# Patient Record
(demographics unavailable — no encounter records)

---

## 2024-10-10 NOTE — DISCUSSION/SUMMARY
[AICD Function Normal] : normal AICD function [Defibrillator Adj to Allow Capture] : adjusted defibrillator to allow capture [Patient] : the patient [Family] : the patient's family

## 2024-10-10 NOTE — PROCEDURE
[ICD] : Implantable cardioverter-defibrillator [DDD] : DDD [Charge Time: ___ sec] : charge time was [unfilled] seconds [Lead Imp:  ___ohms] : lead impedance was [unfilled] ohms [Sensing Amplitude ___mv] : sensing amplitude was [unfilled] mv [___V @] : [unfilled] V [___ ms] : [unfilled] ms [de-identified] : Abbott [de-identified] : Rosie ROLLE [de-identified] : [id number] [de-identified] : 09/27/2024 [de-identified] : 60 [de-identified] : AP: 5.8% : 1.1% No New Episodes Pt Transmitting on Remote Monitoring

## 2024-10-10 NOTE — HISTORY OF PRESENT ILLNESS
[None] : The patient complains of no symptoms [de-identified] : Cardio: Dr. Franklin HF: Dr. Reynoso  63-year-old male with a medical history of hypertension (HTN), hyperlipidemia (HLD), insulin-dependent diabetes mellitus (IDDM), paroxysmal atrial fibrillation and LV thrombus on Eliquis, HFrEF, chronic kidney disease (CKD), ischemic CMP (EF30-35%) s/p primary prevention dual-chamber ICD (Abbott) on 9/27/2024 (Dr. Hays) presenting for a 1-week post-implant wound check. Denies pain, drainage or swelling at the site.   EKG 10/10/2024: NSR at 84 bpm, RBBB, old anterolateral and inferior q-waves TTE 9/23/2024:  1. Technically difficult study.  2. Endocardial visualization was enhanced with intravenous echo contrast.  3. Left ventricular ejection fraction, by visual estimation, is 30 to  35%.  4. Moderately to severely decreased global left ventricular systolic  function.  5. Multiple left ventricular regional wall motion abnormalities exist.  See wall motion findings.  6. Spectral Doppler shows impaired relaxation pattern of left  ventricular myocardial filling (Grade I diastolic dysfunction).  7. Findings consistent with a laminated apical mural thrombus.  8. Consider CCTA or CMRI for further evaluation.

## 2024-10-10 NOTE — ASSESSMENT
[FreeTextEntry1] : 63-year-old male with ICM s/p primary prevention ICD (Obando) presenting for post-op check.   #ICD -Normal device function. No arrhythmia. R-wave dropped to 3.3 mV (6.0 mV on POD#1, 3.1 mV on implant) -CXR PA-lateral obtained today, no changes in lead position.  -Incision healing well. Dressing taken off today. Can shower. No heavy lifting with left arm for another 4 weeks.  -Continue remote monitoring -Follow-up in 3 months  #pAF -Continue Eliquis 5 mg q12h -Continue metoprolol 100 mg q24h -0% AF burden  #LV thrombus -Continue Eliquis 5 mg q12h  #HFrEF -Euvolemic today -Continue metoprolol 100 mg q24h, Entresto 24-26 mg q24h, Farxiga 10 mg q24h -Follow-up with Dr. Reynoso and Dr. Franklin

## 2024-10-10 NOTE — PHYSICAL EXAM
[General Appearance - Well Developed] : well developed [Normal Appearance] : normal appearance [Well Groomed] : well groomed [General Appearance - Well Nourished] : well nourished [No Deformities] : no deformities [General Appearance - In No Acute Distress] : no acute distress [Heart Rate And Rhythm] : heart rate and rhythm were normal [Heart Sounds] : normal S1 and S2 [Murmurs] : no murmurs present [Respiration, Rhythm And Depth] : normal respiratory rhythm and effort [Exaggerated Use Of Accessory Muscles For Inspiration] : no accessory muscle use [Auscultation Breath Sounds / Voice Sounds] : lungs were clear to auscultation bilaterally [Left Infraclavicular] : left infraclavicular area [Clean] : clean [Dry] : dry [Healing Well] : healing well [Abdomen Soft] : soft [Abdomen Tenderness] : non-tender [Abdomen Mass (___ Cm)] : no abdominal mass palpated [Nail Clubbing] : no clubbing of the fingernails [Cyanosis, Localized] : no localized cyanosis [Petechial Hemorrhages (___cm)] : no petechial hemorrhages [] : no ischemic changes [Erythema] : not erythematous [Warm] : not warm [Tender] : not tender [Indurated] : not indurated

## 2025-01-09 NOTE — DISCUSSION/SUMMARY
[Patient] : the patient [Family] : the patient's family [Defibrillator Adj to Allow Capture] : adjusted defibrillator to allow capture

## 2025-01-09 NOTE — PHYSICAL EXAM
[General Appearance - Well Developed] : well developed [Normal Appearance] : normal appearance [Well Groomed] : well groomed [General Appearance - Well Nourished] : well nourished [No Deformities] : no deformities [General Appearance - In No Acute Distress] : no acute distress [Heart Rate And Rhythm] : heart rate and rhythm were normal [Heart Sounds] : normal S1 and S2 [Murmurs] : no murmurs present [Respiration, Rhythm And Depth] : normal respiratory rhythm and effort [Exaggerated Use Of Accessory Muscles For Inspiration] : no accessory muscle use [Auscultation Breath Sounds / Voice Sounds] : lungs were clear to auscultation bilaterally [Clean] : clean [Dry] : dry [Well-Healed] : well-healed [Abdomen Soft] : soft [Abdomen Tenderness] : non-tender [] : no hepato-splenomegaly

## 2025-01-09 NOTE — HISTORY OF PRESENT ILLNESS
[None] : The patient complains of no symptoms [de-identified] : Cardio: Dr. Vang/Dr. Franklin HF: Dr. Reynoso  63-year-old male with a medical history of hypertension (HTN), hyperlipidemia (HLD), insulin-dependent diabetes mellitus (IDDM), CAD ( of mLAD and 90% lesion of RPL) which was not amenable to revascularization as CMR did not show viability. He also has paroxysmal atrial fibrillation and chronic LV thrombus on Eliquis, HFrEF, chronic kidney disease (CKD), ischemic CMP (EF30-35%) s/p primary prevention dual-chamber ICD (Abbott) on 9/27/2024 (Dr. Hays).   He returns today for a routine device interrogation. He denies pain or swelling at the site. He denies palpitations or ICD shocks.   -EKG: SR at 66 bpm, RBBB  -EKG 10/10/2024: NSR at 84 bpm, RBBB, old anterolateral and inferior q-waves -TTE 9/23/2024:  1. Technically difficult study.  2. Endocardial visualization was enhanced with intravenous echo contrast.  3. Left ventricular ejection fraction, by visual estimation, is 30 to 35%.  4. Moderately to severely decreased global left ventricular systolic function.  5. Multiple left ventricular regional wall motion abnormalities exist. See wall motion findings.  6. Spectral Doppler shows impaired relaxation pattern of left ventricular myocardial filling (Grade I diastolic dysfunction).  7. Findings consistent with a laminated apical mural thrombus.  8. Consider CCTA or CMRI for further evaluation.

## 2025-01-09 NOTE — ASSESSMENT
[FreeTextEntry1] : 63-year-old male with ICM (EF 30-35%) s/p primary prevention ICD (Abbott) presenting for a routine device check.  #ICD -Normal device function. No arrhythmia. R-wave improved from 3.3 to 4 mV. -Continue remote monitoring -Follow-up in 6 months  #pAF -Continue Eliquis 5 mg q12h -Continue metoprolol 100 mg q24h -0% AF burden (brief AMS episodes)  #Chronic LV thrombus -Continue Eliquis 5 mg q12h  #HFrEF -Euvolemic today -Continue metoprolol 100 mg q24h, Entresto 24-26 mg q24h, Farxiga 10 mg q24h -Follow-up with Dr. Reynoso and Dr. Vang.  EKG was obtained to assist in the evaluation and management of assessed problem(s).

## 2025-01-09 NOTE — PROCEDURE
[No] : not [NSR] : normal sinus rhythm [ICD] : Implantable cardioverter-defibrillator [DDD] : DDD [Charge Time: ___ sec] : charge time was [unfilled] seconds [Longevity: ___ months] : The estimated remaining battery life is [unfilled] months [Lead Imp:  ___ohms] : lead impedance was [unfilled] ohms [Sensing Amplitude ___mv] : sensing amplitude was [unfilled] mv [___V @] : [unfilled] V [___ ms] : [unfilled] ms [Auto Capture "On"] : auto capture was switched "On" [de-identified] : AS-VS @ 87 bpm [de-identified] : Abbott [de-identified] : Rosie ROLLE [de-identified] : [id number] [de-identified] : 09/27/2024 [de-identified] : 60/120 [de-identified] : 8.1-9.2 years [de-identified] : RA autocapture turned on. [de-identified] : AP: 11% : <1% 2 Afib episodes longest lasting 10 seconds on10- (Peak V rate 122 bpm). Corvue does not show fluid. Pt Transmitting on Remote Monitoring

## 2025-01-30 NOTE — DISCUSSION/SUMMARY
[FreeTextEntry1] : PATIENT IS AN INCREASED CARDIAC RISK FOR THE PLANNED PROCEDURE HEART FAILURE FOLLOWUP   CONTINUE ATORVASTATIN  40mg DAILY FOR HYPERLIPIDEMIA CONTINUE ELIQUIS 5mg BID(MAY HOLD 48HRS PRE-OP IF NECESSARY  CONTINUE ENTRESTO 49-51mg BID FOR DECREASED LV FUNCTION CONTINUE FUROSEMIDE 20mg DAILY FOR VOLUME MANAGEMENT CONTINUE METOPROLOL SUCCINATE ER 75mg DAILY FOR ASHD  EP FOLLOWUP  FOLLOWUP WITH PMD FOR PREOP MEDICAL RISK STRATIFICATION AND INSULIN MANAGEMENT [EKG obtained to assist in diagnosis and management of assessed problem(s)] : EKG obtained to assist in diagnosis and management of assessed problem(s)

## 2025-01-30 NOTE — HISTORY OF PRESENT ILLNESS
[FreeTextEntry1] : 63-year-old male with a medical history of hypertension (HTN), hyperlipidemia (HLD), insulin-dependent diabetes mellitus (IDDM), CAD ( of mLAD and 90% lesion of RPL) which was not amenable to revascularization as CMR did not show viability. He also has paroxysmal atrial fibrillation and chronic LV thrombus on Eliquis, HFrEF, chronic kidney disease (CKD), ischemic CMP (EF30-35%) s/p primary prevention dual-chamber ICD (Abbott) on 9/27/2024 (Dr. Hays). Pre-op hernia repair.

## 2025-03-03 NOTE — ASSESSMENT
[FreeTextEntry1] : #ICM #CAD #HFrEF #HTN #HLD #DM #LV thrombus #AF #CKD Overall he does not appear to have any significant cardiac limitations.  He has ischemic cardiomyopathy ACC heart failure stage C with stable NYHA class III symptoms.  Based on cardiac MRI LAD territory is nonviable and given lack of symptoms and overall health decision was made to continue with medical treatment which I agree with.  He also has a history of LV thrombus on echocardiogram from September 2024.  I discussed the importance of continuing optimizing GDMT.  He is currently scheduled for a hernia repair and I have no reservations for him to schedule this.  Labs reviewed from 1/2025 with creatinine 1.5 and otherwise unremarkable.  On exam he is warm, well-perfused, and euvolemic.  He is on standing p.o. Lasix 20 mg daily which I think is reasonable to continue.  BP/HR well-controlled on current GDMT: Metoprolol succinate 100 mg daily, dapagliflozin 10 mg daily, and Entresto 49-51 mg twice daily.  I would like to add MRA but BP on the softer side and we will follow-up on this time.I will make no changes to his GDMT at this time.  I we will repeat echocardiogram and assess for LV thrombus but he is on anticoagulation for A-fib as well.  He already has a subcutaneous ICD that was placed in 9/2024.  Plan -GDMT BB-succinate 100 mg daily ACEi/ARB/ARNI-Entresto 49-51 mg twice daily SGLT2i-dapagliflozin 10 mg daily MRA-can consider at next visit Diuretic-lasix 20 mg daily Device-s-ICD -Repeat TTE with contrast  -Eliquis for AF and LV thrombus -ASA for CAD -Atorvastatin 40 mg daily -RTC 3 months  Juan Miguel Morales MD Interventional Cardiology/Advanced Heart Failure Transplant [facility]

## 2025-03-03 NOTE — HISTORY OF PRESENT ILLNESS
[FreeTextEntry1] : 63-year-old male past medical history ischemic cardiomyopathy medically managed, HFrEF, hypertension, hyperlipidemia, diabetes, CKD, LV thrombus, CVA, atrial fibrillation.  Patient of Dr. Franklin. Last seen by Dr. Reynoso 2/2022.  History of severe CAD but medically managed due to nonviability.  Clinic visit 3/3/2025. Currently lives in a assisted living facility. Is able to able ambulate with walker/cane. Overall reports no significant functional limitations. Denies fatigue, dizziness, lightheadedness, syncope, or presyncope. Denies chest discomfort, dyspnea, palpitations. Denies weight gain, edema, PND, orthopnea, bendopnea. Denies abdominal pain, n/v/d/c. Denies bleeding. Denies fevers/chills, lymph nodes, or skin changes. No recent hospitalizations. Denies any ICD shocks.

## 2025-03-03 NOTE — ASSESSMENT
[FreeTextEntry1] : #ICM #CAD #HFrEF #HTN #HLD #DM #LV thrombus #AF #CKD Overall he does not appear to have any significant cardiac limitations.  He has ischemic cardiomyopathy ACC heart failure stage C with stable NYHA class III symptoms.  Based on cardiac MRI LAD territory is nonviable and given lack of symptoms and overall health decision was made to continue with medical treatment which I agree with.  He also has a history of LV thrombus on echocardiogram from September 2024.  I discussed the importance of continuing optimizing GDMT.  He is currently scheduled for a hernia repair and I have no reservations for him to schedule this.  Labs reviewed from 1/2025 with creatinine 1.5 and otherwise unremarkable.  On exam he is warm, well-perfused, and euvolemic.  He is on standing p.o. Lasix 20 mg daily which I think is reasonable to continue.  BP/HR well-controlled on current GDMT: Metoprolol succinate 100 mg daily, dapagliflozin 10 mg daily, and Entresto 49-51 mg twice daily.  I would like to add MRA but BP on the softer side and we will follow-up on this time.I will make no changes to his GDMT at this time.  I we will repeat echocardiogram and assess for LV thrombus but he is on anticoagulation for A-fib as well.  He already has a subcutaneous ICD that was placed in 9/2024.  Plan -GDMT BB-succinate 100 mg daily ACEi/ARB/ARNI-Entresto 49-51 mg twice daily SGLT2i-dapagliflozin 10 mg daily MRA-can consider at next visit Diuretic-lasix 20 mg daily Device-s-ICD -Repeat TTE with contrast  -Eliquis for AF and LV thrombus -ASA for CAD -Atorvastatin 40 mg daily -RTC 3 months  Juan Miguel Morales MD Interventional Cardiology/Advanced Heart Failure Transplant [facility] - [facility]

## 2025-03-03 NOTE — CARDIOLOGY SUMMARY
[de-identified] : NSR, RBBB, anterior q waves [de-identified] : TTE 9/2024 LVEF 30%, LV thrombus [de-identified] : 6/2021 1. Global left ventricular hypokinesis with areas of the akinesis and marked thinning as above. 2. Nonviable infarct involving the left ventricular mid anterior and inferior walls, with additional involvement of the apical segments. 3. Normal right ventricular size and function. 4. 2.4 x 4.1 x 4 cm  adherent left ventricular thrombus. 5. Dilated ascending thoracic aorta measuring up to 4.6 cm at the level of the main pulmonary artery. [de-identified] : ICD implanted 10/2024 [de-identified] : [facility] 6/2021  of mLAD and 90% lesion of RPL

## 2025-03-03 NOTE — CARDIOLOGY SUMMARY
[de-identified] : NSR, RBBB, anterior q waves [de-identified] : TTE 9/2024 LVEF 30%, LV thrombus [de-identified] : 6/2021 1. Global left ventricular hypokinesis with areas of the akinesis and marked thinning as above. 2. Nonviable infarct involving the left ventricular mid anterior and inferior walls, with additional involvement of the apical segments. 3. Normal right ventricular size and function. 4. 2.4 x 4.1 x 4 cm  adherent left ventricular thrombus. 5. Dilated ascending thoracic aorta measuring up to 4.6 cm at the level of the main pulmonary artery. [de-identified] : ICD implanted 10/2024 [de-identified] : [facility] 6/2021  of mLAD and 90% lesion of RPL

## 2025-05-01 NOTE — HISTORY OF PRESENT ILLNESS
[de-identified] : 64 yo M presents to the office today for initial consultation for erythrocytosis. Referred by PSAT . Pt has a PMHx of DM, A-Fib, MI, ICD, CKD, Pacemaker, CHF, CHAVA  Pt has a PSHx umbilical hernia, Pacemaker  Pt has a FHx of Mother HTN, Sister Lymphoma, Father Skin Cancer. Social History: non-drinker stopped 29 years ago, 1 PPD for 40 years, has been 2 PPD recently, single,    He is having surgery on 5/5/25 with  doing a hernia repair  This is the first time he has been told his H/H is elevated  He has never used testosterone  He is a chronic smoker and has CHAVA not using a CPAP machine He has no recent travels, never had a BLT and was never told he had COPD He does have CKD and does try to stay hydrated as much as he can  Medication- See Med list  Allergies- never Diet- eats some green vegetables, rarely eats red meats, eats more chicken and fish    Pt denies fever, diarrhea, fatigue, chills, nausea, vomiting, SOB, dyspnea, unintentional weight loss or bleeding. Pt denies palpitations, headaches weakness, dizziness, itchy, nose bleeds, blurry vision. No epistaxis, hematemesis or hemoptysis.   Healthcare Maintenace: PCP-  Surgeon-  GI- Never  Colonoscopy- Never (denies does not want)  Upper Endoscopy- Never (denies does not want)    Labs on 1/29/25 reviewed and discussed with patient. WBC 10.6, Hgb 18.3, Hct 53.6, , MCV 90, eGFR 60, Creatinine 1.22 Ferritin 50, Iron Serum 110, Iron sat 33, TIBC 335

## 2025-05-01 NOTE — HISTORY OF PRESENT ILLNESS
[de-identified] : 62 yo M presents to the office today for initial consultation for erythrocytosis. Referred by PSAT . Pt has a PMHx of DM, A-Fib, MI, ICD, CKD, Pacemaker, CHF, CHAVA  Pt has a PSHx umbilical hernia, Pacemaker  Pt has a FHx of Mother HTN, Sister Lymphoma, Father Skin Cancer. Social History: non-drinker stopped 29 years ago, 1 PPD for 40 years, has been 2 PPD recently, single,    He is having surgery on 5/5/25 with  doing a hernia repair  This is the first time he has been told his H/H is elevated  He has never used testosterone  He is a chronic smoker and has CHAVA not using a CPAP machine He has no recent travels, never had a BLT and was never told he had COPD He does have CKD and does try to stay hydrated as much as he can  Medication- See Med list  Allergies- never Diet- eats some green vegetables, rarely eats red meats, eats more chicken and fish    Pt denies fever, diarrhea, fatigue, chills, nausea, vomiting, SOB, dyspnea, unintentional weight loss or bleeding. Pt denies palpitations, headaches weakness, dizziness, itchy, nose bleeds, blurry vision. No epistaxis, hematemesis or hemoptysis.   Healthcare Maintenace: PCP-  Surgeon-  GI- Never  Colonoscopy- Never (denies does not want)  Upper Endoscopy- Never (denies does not want)    Labs on 1/29/25 reviewed and discussed with patient. WBC 10.6, Hgb 18.3, Hct 53.6, , MCV 90, eGFR 60, Creatinine 1.22 Ferritin 50, Iron Serum 110, Iron sat 33, TIBC 335

## 2025-05-01 NOTE — PHYSICAL EXAM
[Restricted in physically strenuous activity but ambulatory and able to carry out work of a light or sedentary nature] : Status 1- Restricted in physically strenuous activity but ambulatory and able to carry out work of a light or sedentary nature, e.g., light house work, office work [Normal] : affect appropriate [de-identified] : Uses walker

## 2025-05-01 NOTE — BEGINNING OF VISIT
[0] : 2) Feeling down, depressed, or hopeless: Not at all (0) [PHQ-2 Negative] : PHQ-2 Negative [Current] : Current [20 or more] : 20 or more [Date Discussed (MM/DD/YY): ___] : Discussed: [unfilled] [Reviewed, no changes] : Reviewed, no changes [[id number]] : 0

## 2025-05-01 NOTE — HISTORY OF PRESENT ILLNESS
[de-identified] : 64 yo M presents to the office today for initial consultation for erythrocytosis. Referred by PSAT . Pt has a PMHx of DM, A-Fib, MI, ICD, CKD, Pacemaker, CHF, CHAVA  Pt has a PSHx umbilical hernia, Pacemaker  Pt has a FHx of Mother HTN, Sister Lymphoma, Father Skin Cancer. Social History: non-drinker stopped 29 years ago, 1 PPD for 40 years, has been 2 PPD recently, single,    He is having surgery on 5/5/25 with  doing a hernia repair  This is the first time he has been told his H/H is elevated  He has never used testosterone  He is a chronic smoker and has CHAVA not using a CPAP machine He has no recent travels, never had a BLT and was never told he had COPD He does have CKD and does try to stay hydrated as much as he can  Medication- See Med list  Allergies- never Diet- eats some green vegetables, rarely eats red meats, eats more chicken and fish    Pt denies fever, diarrhea, fatigue, chills, nausea, vomiting, SOB, dyspnea, unintentional weight loss or bleeding. Pt denies palpitations, headaches weakness, dizziness, itchy, nose bleeds, blurry vision. No epistaxis, hematemesis or hemoptysis.   Healthcare Maintenace: PCP-  Surgeon-  GI- Never  Colonoscopy- Never (denies does not want)  Upper Endoscopy- Never (denies does not want)    Labs on 1/29/25 reviewed and discussed with patient. WBC 10.6, Hgb 18.3, Hct 53.6, , MCV 90, eGFR 60, Creatinine 1.22 Ferritin 50, Iron Serum 110, Iron sat 33, TIBC 335

## 2025-05-01 NOTE — PHYSICAL EXAM
[Restricted in physically strenuous activity but ambulatory and able to carry out work of a light or sedentary nature] : Status 1- Restricted in physically strenuous activity but ambulatory and able to carry out work of a light or sedentary nature, e.g., light house work, office work [Normal] : affect appropriate [de-identified] : Uses walker

## 2025-05-01 NOTE — BEGINNING OF VISIT
[0] : 2) Feeling down, depressed, or hopeless: Not at all (0) [PHQ-2 Negative] : PHQ-2 Negative [Current] : Current [20 or more] : 20 or more [Date Discussed (MM/DD/YY): ___] : Discussed: [unfilled] [Reviewed, no changes] : Reviewed, no changes [[id number]] : 0

## 2025-05-01 NOTE — REASON FOR VISIT
[Initial Consultation] : an initial consultation for [Family Member] : family member [FreeTextEntry2] : Erythrocytosis

## 2025-05-01 NOTE — HISTORY OF PRESENT ILLNESS
[de-identified] : 64 yo M presents to the office today for initial consultation for erythrocytosis. Referred by PSAT . Pt has a PMHx of DM, A-Fib, MI, ICD, CKD, Pacemaker, CHF, CHAVA  Pt has a PSHx umbilical hernia, Pacemaker  Pt has a FHx of Mother HTN, Sister Lymphoma, Father Skin Cancer. Social History: non-drinker stopped 29 years ago, 1 PPD for 40 years, has been 2 PPD recently, single,    He is having surgery on 5/5/25 with  doing a hernia repair  This is the first time he has been told his H/H is elevated  He has never used testosterone  He is a chronic smoker and has CHAVA not using a CPAP machine He has no recent travels, never had a BLT and was never told he had COPD He does have CKD and does try to stay hydrated as much as he can  Medication- See Med list  Allergies- never Diet- eats some green vegetables, rarely eats red meats, eats more chicken and fish    Pt denies fever, diarrhea, fatigue, chills, nausea, vomiting, SOB, dyspnea, unintentional weight loss or bleeding. Pt denies palpitations, headaches weakness, dizziness, itchy, nose bleeds, blurry vision. No epistaxis, hematemesis or hemoptysis.   Healthcare Maintenace: PCP-  Surgeon-  GI- Never  Colonoscopy- Never (denies does not want)  Upper Endoscopy- Never (denies does not want)    Labs on 1/29/25 reviewed and discussed with patient. WBC 10.6, Hgb 18.3, Hct 53.6, , MCV 90, eGFR 60, Creatinine 1.22 Ferritin 50, Iron Serum 110, Iron sat 33, TIBC 335

## 2025-05-01 NOTE — PHYSICAL EXAM
[Restricted in physically strenuous activity but ambulatory and able to carry out work of a light or sedentary nature] : Status 1- Restricted in physically strenuous activity but ambulatory and able to carry out work of a light or sedentary nature, e.g., light house work, office work [Normal] : affect appropriate [de-identified] : Uses walker

## 2025-05-01 NOTE — ASSESSMENT
[FreeTextEntry1] : 62 yo M presents to the office today for initial consultation for erythrocytosis. Referred by PSAT . He is having surgery on 5/5/25 with  doing a hernia repair This is the first time he has been told his H/H is elevated He has never used testosterone He is a chronic smoker and has CHAVA not using a CPAP machine He has no recent travels, never had a BLT and was never told he had COPD He does have CKD and does try to stay hydrated as much as he can Diet- eats some green vegetables, rarely eats red meats, eats more chicken and fish  Impression: Erythrocytosis   Plan: Previous chart and previous labs reviewed. Labs on 1/29/25 reviewed and discussed with patient. WBC 10.6, Hgb 18.3, Hct 53.6, , MCV 90, eGFR 60, Creatinine 1.22 Ferritin 50, Iron Serum 110, Iron sat 33, TIBC 335. --- Today ordered labs: CBC, Ferritin, TIBC, CMP, EPO, Felix-2, --- Labs to be done same day when they RTC: CBC with Possible Phlebotomy same day --- Schedule for July CBC with Possible Phlebotomy same day --- Schedule CBC with Possible Phlebotomy same day on 4/30/25 and 5/2/25  --- Target goal for Anesthesia is HCT of <52% --- Phlebotomize 1 unit of whole blood with PO hydration if HCT is over 50%, or if patient is symptomatic --- Will call pt with lab results  --- Pt advised to follow up with their PCP, GI as directed   RTC in 6 months with AMY Mantilla

## 2025-05-01 NOTE — BEGINNING OF VISIT
[0] : 2) Feeling down, depressed, or hopeless: Not at all (0) [PHQ-2 Negative] : PHQ-2 Negative [Current] : Current [20 or more] : 20 or more [Date Discussed (MM/DD/YY): ___] : Discussed: [unfilled] [Reviewed, no changes] : Reviewed, no changes [[id number]] : 0

## 2025-05-01 NOTE — BEGINNING OF VISIT
[0] : 2) Feeling down, depressed, or hopeless: Not at all (0) [PHQ-2 Negative] : PHQ-2 Negative [Current] : Current [20 or more] : 20 or more [Date Discussed (MM/DD/YY): ___] : Discussed: [unfilled] [Reviewed, no changes] : Reviewed, no changes [[id number]] : 0

## 2025-05-01 NOTE — ASSESSMENT
[FreeTextEntry1] : 64 yo M presents to the office today for initial consultation for erythrocytosis. Referred by PSAT . He is having surgery on 5/5/25 with  doing a hernia repair This is the first time he has been told his H/H is elevated He has never used testosterone He is a chronic smoker and has CHAVA not using a CPAP machine He has no recent travels, never had a BLT and was never told he had COPD He does have CKD and does try to stay hydrated as much as he can Diet- eats some green vegetables, rarely eats red meats, eats more chicken and fish  Impression: Erythrocytosis   Plan: Previous chart and previous labs reviewed. Labs on 1/29/25 reviewed and discussed with patient. WBC 10.6, Hgb 18.3, Hct 53.6, , MCV 90, eGFR 60, Creatinine 1.22 Ferritin 50, Iron Serum 110, Iron sat 33, TIBC 335. --- Today ordered labs: CBC, Ferritin, TIBC, CMP, EPO, Felix-2, --- Labs to be done same day when they RTC: CBC with Possible Phlebotomy same day --- Schedule for July CBC with Possible Phlebotomy same day --- Schedule CBC with Possible Phlebotomy same day on 4/30/25 and 5/2/25  --- Target goal for Anesthesia is HCT of <52% --- Phlebotomize 1 unit of whole blood with PO hydration if HCT is over 50%, or if patient is symptomatic --- Will call pt with lab results  --- Pt advised to follow up with their PCP, GI as directed   RTC in 6 months with AMY Mantilla

## 2025-05-01 NOTE — PHYSICAL EXAM
[Restricted in physically strenuous activity but ambulatory and able to carry out work of a light or sedentary nature] : Status 1- Restricted in physically strenuous activity but ambulatory and able to carry out work of a light or sedentary nature, e.g., light house work, office work [Normal] : affect appropriate [de-identified] : Uses walker

## 2025-05-12 NOTE — ASSESSMENT
[FreeTextEntry1] : ROBERTO LYLES underwent a very very large incarcerated right inguinal hernia repair with mesh with Dr. Bruce on 5/5/25 under local IV sedation without any problems or complications. His wound is clean, dry and intact. There is no evidence of erythema, seroma formation or infection. He is tolerating a diet and having normal bowel movements. He denies any significant postoperative pain or discomfort at this time.   He was counseled and reassured. ROBERTO was discharged from the office with no specific follow up necessary, but he knows to avoid any heavy lifting or strenuous activity for the next several weeks.

## 2025-05-12 NOTE — CONSULT LETTER
[Dear  ___] : Dear  [unfilled], [Courtesy Letter:] : I had the pleasure of seeing your patient, [unfilled], in my office today. [Please see my note below.] : Please see my note below. [Consult Closing:] : Thank you very much for allowing me to participate in the care of this patient.  If you have any questions, please do not hesitate to contact me. [Sincerely,] : Sincerely, [FreeTextEntry3] :     Jada Lambert PA-C, MSPAS [DrTrevon  ___] : Dr. SORTO [DrTrevon ___] : Dr. SORTO

## 2025-07-10 NOTE — HISTORY OF PRESENT ILLNESS
[None] : The patient complains of no symptoms [de-identified] : Cardio: Dr. Vang/Dr. Franklin HF: Dr. Reynoso  63-year-old male with a medical history of hypertension (HTN), hyperlipidemia (HLD), insulin-dependent diabetes mellitus (IDDM), CAD ( of mLAD and 90% lesion of RPL) which was not amenable to revascularization as CMR did not show viability. He also has paroxysmal atrial fibrillation and chronic LV thrombus on Eliquis, HFrEF, chronic kidney disease (CKD), ischemic CMP (EF30-35%) s/p primary prevention dual-chamber ICD (Abbott) on 9/27/2024 (Dr. Hays).   He returns today for a routine device interrogation. He denies pain or swelling at the site. He denies palpitations or ICD shocks.   7/10/2025: Returns for a routine follow-up. He is accompanied by his sister Britney ULLOA at [facility]. He has developed a left posterior foot ulcer, being evaluated by wound care and plastic surgery. He saw vascular surgery in [place] and planned for a venous ablation. They would like to see vascular here on . He denies chest pain, dizziness, syncope, palpitations, or ICD shocks.   -EKG: SR with PAC, LAD, RBBB, at 70 bpm -EKG: SR at 66 bpm, RBBB  -EKG 10/10/2024: NSR at 84 bpm, RBBB, old anterolateral and inferior q-waves -TTE 9/23/2024:  1. Technically difficult study.  2. Endocardial visualization was enhanced with intravenous echo contrast.  3. Left ventricular ejection fraction, by visual estimation, is 30 to 35%.  4. Moderately to severely decreased global left ventricular systolic function.  5. Multiple left ventricular regional wall motion abnormalities exist. See wall motion findings.  6. Spectral Doppler shows impaired relaxation pattern of left ventricular myocardial filling (Grade I diastolic dysfunction).  7. Findings consistent with a laminated apical mural thrombus.  8. Consider CCTA or CMRI for further evaluation.

## 2025-07-10 NOTE — PROCEDURE
[No] : not [NSR] : normal sinus rhythm [ICD] : Implantable cardioverter-defibrillator [DDD] : DDD [Charge Time: ___ sec] : charge time was [unfilled] seconds [Normal] : The battery status is normal. [Lead Imp:  ___ohms] : lead impedance was [unfilled] ohms [Sensing Amplitude ___mv] : sensing amplitude was [unfilled] mv [___V @] : [unfilled] V [___ ms] : [unfilled] ms [None] : none [Programmed for Longevity] : output reprogrammed for improved battery longevity [de-identified] : Abbott [de-identified] : [id number] [de-identified] : [id number] [de-identified] : 9- [de-identified] : 60/120 [de-identified] : 7.6-8.8 years [de-identified] : AP 9.9%    <1% AT/AF burden <1% 1 Afib episode lasting 6 seconds on 5-. CorVue does not show fluid. Pt is on remote monitoring.

## 2025-07-10 NOTE — PHYSICAL EXAM
[General Appearance - Well Developed] : well developed [Normal Appearance] : normal appearance [Well Groomed] : well groomed [General Appearance - Well Nourished] : well nourished [No Deformities] : no deformities [General Appearance - In No Acute Distress] : no acute distress [Heart Rate And Rhythm] : heart rate and rhythm were normal [Heart Sounds] : normal S1 and S2 [Murmurs] : no murmurs present [Respiration, Rhythm And Depth] : normal respiratory rhythm and effort [Exaggerated Use Of Accessory Muscles For Inspiration] : no accessory muscle use [Auscultation Breath Sounds / Voice Sounds] : lungs were clear to auscultation bilaterally [Clean] : clean [Dry] : dry [Well-Healed] : well-healed [Abdomen Soft] : soft [Abdomen Tenderness] : non-tender [Abdomen Mass (___ Cm)] : no abdominal mass palpated [Nail Clubbing] : no clubbing of the fingernails [Cyanosis, Localized] : no localized cyanosis [] : no ischemic changes [FreeTextEntry1] : left foot wrapped, + 1 L popliteal pulse, right DP and PT +1

## 2025-07-10 NOTE — DISCUSSION/SUMMARY
[Defibrillator Adj to Allow Capture] : adjusted defibrillator to allow capture [Patient] : the patient [Family] : the patient's family

## 2025-07-10 NOTE — ASSESSMENT
[FreeTextEntry1] : 63-year-old male with ICM (EF 30-35%) s/p primary prevention ICD (Abbott) presenting for a routine device check. He has developed a non-healing ulcer over the posterior left foot.  #ICD -Normal device function. No arrhythmia.  -Continue remote monitoring -Follow-up in 6 months  #pAF -Continue Eliquis 5 mg q12h -Continue metoprolol 100 mg q24h -<1% AF burden (brief AMS episodes)  #Chronic LV thrombus -Continue Eliquis 5 mg q12h  #HFrEF -Euvolemic today -Continue metoprolol 100 mg q24h, Entresto 24-26 mg q24h, Farxiga 10 mg q24h (hold for 3 days if going for a procedure) -Follow-up with HF and cardiology  #Left foot ulcer -Follow-up wound care -Vascular cardiology referral   EKG was obtained to assist in the evaluation and management of assessed problem(s).         Follow-up in 6 months Vascular cardiology referral

## 2025-07-14 NOTE — CARDIOLOGY SUMMARY
[de-identified] : NSR [de-identified] : TTE 3/2025 LVEF 30-35%, no LV thrombus 9/2024 LVEF 30%, LV thrombus   [de-identified] : 6/2021 1. Global left ventricular hypokinesis with areas of the akinesis and marked thinning as above. 2. Nonviable infarct involving the left ventricular mid anterior and inferior walls, with additional involvement of the apical segments. 3. Normal right ventricular size and function. 4. 2.4 x 4.1 x 4 cm adherent left ventricular thrombus. 5. Dilated ascending thoracic aorta measuring up to 4.6 cm at the level of the main pulmonary artery.   [de-identified] : ICD implanted 10/2024   [de-identified] : [facility] 6/2021  of mLAD and 90% lesion of RPL

## 2025-07-14 NOTE — ASSESSMENT
[FreeTextEntry1] : #ICM #CAD #HFrEF #HTN #HLD #DM #LV thrombus #AF #CKD Overall continues to do fairly well from a cardiac perspective. He has ischemic cardiomyopathy ACC heart failure stage C with stable NYHA class III symptoms. Based on cardiac MRI LAD territory is nonviable and given lack of symptoms and overall health decision, will continue with medical treatment. Latest TTE from 3/2025 with no significant change in LV function, but resolution of LV thrombus.  Labs reviewed from 6/2025 with creatinine 1.3, K 4.8 and otherwise unremarkable. No proBNP. K has generally been greater than 4.5.  On exam he is warm, well-perfused, and euvolemic. He is on standing p.o. Lasix 20 mg daily which I think is reasonable to continue. BP/HR reasonably controlled on current GDMT: Metoprolol succinate 100 mg daily, dapagliflozin 10 mg daily, and Entresto 49-51 mg twice daily.  HR has been generally in high 70s and will increase succinate 150 mg daily.  Will restart dapagliflozin 10 mg daily.  Will not start MRA given last few lab work as demonstrated high potassium level.  Based on his most recent medication list it seems that he is on hydralazine 25 mg 3 times daily.  Patient does not know if he is taking this medication.  If he is I think it is fine because his BP is well-controlled but ideally would like to stop and increase his Entresto to  mg instead.  Can continue current regimen.  He is on Eliquis for A-fib but LV thrombus is resolved.  Plan -GDMT BB-increase metoprolol succinate 150 mg daily ACEi/ARB/ARNI-Entresto 49-51 mg twice daily Vasodilators-Hydralazine 25 mg three times daily SGLT2i-dapagliflozin 10 mg daily MRA-can consider at next visit Diuretic-lasix 20 mg daily Device-s-ICD -Eliquis for AF -ASA for CAD -Atorvastatin 40 mg daily -RTC 6 months  Juan Miguel Morales MD Interventional Cardiology/Advanced Heart Failure Transplant [facility] - [facility]

## 2025-07-14 NOTE — HISTORY OF PRESENT ILLNESS
[FreeTextEntry1] : 63-year-old male past medical history ischemic cardiomyopathy medically managed, HFrEF, hypertension, hyperlipidemia, diabetes, CKD, LV thrombus, CVA, atrial fibrillation.  Patient of Dr. Franklin. Last seen by Dr. Reynoso 2/2022. History of severe CAD but medically managed due to nonviability.  Clinic visit 3/3/2025. Currently lives in a assisted living facility. Is able to able ambulate with walker/cane. Overall reports no significant functional limitations. Denies fatigue, dizziness, lightheadedness, syncope, or presyncope. Denies chest discomfort, dyspnea, palpitations. Denies weight gain, edema, PND, orthopnea, bendopnea. Denies abdominal pain, n/v/d/c. Denies bleeding. Denies fevers/chills, lymph nodes, or skin changes. No recent hospitalizations. Denies any ICD shocks. Underwent hernia repair 5/205.  Clinic visit 7/14/25. Hernia repair went well. He has no issues since being back at nursing home. Farxiga was stopped before surgery but never restarted.  Mostly sedentary but is able to get around nursing home with wheelchair/walker. Denies fatigue, dizziness, lightheadedness, syncope, or presyncope. Denies chest discomfort, dyspnea, palpitations. Denies weight gain, edema, PND, orthopnea, bendopnea. Denies abdominal pain, n/v/d/c. Denies bleeding. Denies fevers/chills, lymph nodes, or skin changes. Denies any ICD shocks.

## 2025-07-14 NOTE — ASSESSMENT
[FreeTextEntry1] : #ICM #CAD #HFrEF #HTN #HLD #DM #LV thrombus #AF #CKD Overall continues to do fairly well from a cardiac perspective. He has ischemic cardiomyopathy ACC heart failure stage C with stable NYHA class III symptoms. Based on cardiac MRI LAD territory is nonviable and given lack of symptoms and overall health decision, will continue with medical treatment. Latest TTE from 3/2025 with no significant change in LV function, but resolution of LV thrombus.  Labs reviewed from 6/2025 with creatinine 1.3, K 4.8 and otherwise unremarkable. No proBNP. K has generally been greater than 4.5.  On exam he is warm, well-perfused, and euvolemic. He is on standing p.o. Lasix 20 mg daily which I think is reasonable to continue. BP/HR reasonably controlled on current GDMT: Metoprolol succinate 100 mg daily, dapagliflozin 10 mg daily, and Entresto 49-51 mg twice daily.  HR has been generally in high 70s and will increase succinate 150 mg daily.  Will restart dapagliflozin 10 mg daily.  Will not start MRA given last few lab work as demonstrated high potassium level.  Based on his most recent medication list it seems that he is on hydralazine 25 mg 3 times daily.  Patient does not know if he is taking this medication.  If he is I think it is fine because his BP is well-controlled but ideally would like to stop and increase his Entresto to  mg instead.  Can continue current regimen.  He is on Eliquis for A-fib but LV thrombus is resolved.  Plan -GDMT BB-increase metoprolol succinate 150 mg daily ACEi/ARB/ARNI-Entresto 49-51 mg twice daily Vasodilators-Hydralazine 25 mg three times daily SGLT2i-dapagliflozin 10 mg daily MRA-can consider at next visit Diuretic-lasix 20 mg daily Device-s-ICD -Eliquis for AF -ASA for CAD -Atorvastatin 40 mg daily -RTC 6 months  Juan Miguel Morales MD Interventional Cardiology/Advanced Heart Failure Transplant [facility]